# Patient Record
(demographics unavailable — no encounter records)

---

## 2024-11-05 NOTE — DISCUSSION/SUMMARY
[FreeTextEntry1] : Counseled fully. PREWORK: Reviewed prior notes, reports, and results. Independent historian; parent.  Patient presents with parent for sick visit c/o congestion, cough, and sore throat.  RAPID STREP: Positive. 18 year boy found to be rapid strep positive. Complete 10 days of antibiotics.  After being on antibiotics for at least 24 hours patient less likely to spread infection. RX Duricef BID x10 days.  Symptoms likely due to viral URI. Recommend supportive care including antipyretics, fluids, and nasal saline followed by nasal suction. Return if symptoms worsen or persist.

## 2024-12-17 NOTE — DISCUSSION/SUMMARY
[] : The components of the vaccine(s) to be administered today are listed in the plan of care. The disease(s) for which the vaccine(s) are intended to prevent and the risks have been discussed with the caretaker.  The risks are also included in the appropriate vaccination information statements which have been provided to the patient's caregiver.  The caregiver has given consent to vaccinate. [Met privately with the adolescent for part of the office visit?] : Met privately with the adolescent for part of the office visit? Yes [Initiated discussion about transfer to an adult healthcare provider?] : Initiated discussion about transfer to an adult healthcare provider? Yes  [FreeTextEntry1] : Counseled fully. PREWORK: Reviewed prior notes, reports, and results. Independent historian; parent.  RBW UTD 2021  PHQ-9: Negative  CRAFFT Done 2023  SOGI: Completed.  Continue balanced diet with all food groups. Brush teeth twice a day with toothbrush. Recommend visit to dentist. Maintain consistent daily routines and sleep schedule. Personal hygiene, puberty, and sexual health reviewed. Risky behaviors assessed. School discussed. Limit screen time to no more than 2 hours per day. Encourage physical activity. Return 1 year for routine well child check.

## 2024-12-17 NOTE — HISTORY OF PRESENT ILLNESS
[Sleep Concerns] : no sleep concerns [de-identified] : Flu and Men B #2 [de-identified] : Anticipatory Guidance Provided [FreeTextEntry1] : PT HERE FOR 18 YR [place] - MEN B #2 + FLU SHOT  DOING WELL OVERALL   OAE- OOS VISION- 20/20 BOTH EYES  UA- ALL NORMAL

## 2024-12-31 NOTE — DISCUSSION/SUMMARY
[de-identified] : 18-year-old male with right shoulder acromioclavicular joint injury he does have mild swelling and pain with overhead and cross body activities.  There is no displaced injury.  He will monitor symptoms at this time avoiding overhead and cross body activities if symptoms do not improve consider MRI to rule out stress fracture to the distal clavicle.  Tylenol anti-inflammatory medications ice as needed for pain he may follow-up as needed

## 2024-12-31 NOTE — HISTORY OF PRESENT ILLNESS
[de-identified] :  18-year-old male chief complaint of right shoulder pain he is at pain for approximately 1 month pain is located in the superior aspect of his shoulder he denies any specific injury.  He noted pain initially while throwing a ball but now has pain with overhead activity and weight lifting.  He noticed a slight bump at the top of his shoulder he denies numbness tingling fevers chills The patient's past medical history, past surgical history, medications, allergies, and social history were reviewed by me today with the patient and documented accordingly. In addition, the patient's family history, which is noncontributory to this visit, was also reviewed.

## 2024-12-31 NOTE — PHYSICAL EXAM
[de-identified] : General Exam   Well developed, well nourished No apparent distress Oriented to person, place, and time Mood: Normal Affect: Normal Balance and coordination: Normal Gait: Normal  Right shoulder exam   Inspection: No swelling, ecchymosis or gross deformity. Skin: No masses, No lesions Tenderness: No bicipital tenderness, no tenderness to the greater tuberosity/RTC insertion, no anterior shoulder/lesser tuberosity tenderness. No tenderness SC joint, clavicle, + ttp AC joint. ROM: 160/60/T6 Impingement tests: Positive Hardwick AC Joint: + pain with cross arm testing Biceps: Negative speed Strength: 5/5 abduction, external rotation, and internal rotation Neuro: AIN, PIN, Ulnar nerve motor intact Sensation: Intact to light touch in radial, median, ulnar, and axillary nerve distributions Vasc: 2+ radial pulse [de-identified] : The following radiographs were ordered and read by me during this patients visit. I reviewed each radiograph in detail with the patient and discussed the findings as highlighted below.  Bilateral acromioclavicular joint radiographs 3 views of the right shoulder were obtained today the glenohumeral joint is well-maintained there is a slight prominence of the distal clavicle on the right side there is no acute fracture